# Patient Record
Sex: FEMALE | Race: WHITE | NOT HISPANIC OR LATINO | URBAN - METROPOLITAN AREA
[De-identification: names, ages, dates, MRNs, and addresses within clinical notes are randomized per-mention and may not be internally consistent; named-entity substitution may affect disease eponyms.]

---

## 2005-11-01 LAB — HM MAMMOGRAM: NORMAL

## 2005-11-09 LAB — HM DXA SCAN: NORMAL

## 2006-11-28 LAB — HM PAP SMEAR: NORMAL

## 2017-07-13 ENCOUNTER — INPATIENT HOSPITAL (OUTPATIENT)
Dept: URBAN - METROPOLITAN AREA HOSPITAL 84 | Facility: HOSPITAL | Age: 57
End: 2017-07-13
Payer: MEDICAID

## 2017-07-13 DIAGNOSIS — R19.7 DIARRHEA, UNSPECIFIED: ICD-10-CM

## 2017-07-13 DIAGNOSIS — K62.5 HEMORRHAGE OF ANUS AND RECTUM: ICD-10-CM

## 2017-07-13 PROCEDURE — 99254 IP/OBS CNSLTJ NEW/EST MOD 60: CPT | Performed by: NURSE PRACTITIONER

## 2017-07-14 ENCOUNTER — INPATIENT HOSPITAL (OUTPATIENT)
Dept: URBAN - METROPOLITAN AREA HOSPITAL 84 | Facility: HOSPITAL | Age: 57
End: 2017-07-14
Payer: MEDICAID

## 2017-07-14 DIAGNOSIS — C20 MALIGNANT NEOPLASM OF RECTUM: ICD-10-CM

## 2017-07-14 DIAGNOSIS — K57.90 DIVERTICULOSIS OF INTESTINE, PART UNSPECIFIED, WITHOUT PERFO: ICD-10-CM

## 2017-07-14 DIAGNOSIS — K52.9 NONINFECTIVE GASTROENTERITIS AND COLITIS, UNSPECIFIED: ICD-10-CM

## 2017-07-14 PROCEDURE — 45385 COLONOSCOPY W/LESION REMOVAL: CPT | Performed by: INTERNAL MEDICINE

## 2017-07-15 ENCOUNTER — INPATIENT HOSPITAL (OUTPATIENT)
Dept: URBAN - METROPOLITAN AREA HOSPITAL 84 | Facility: HOSPITAL | Age: 57
End: 2017-07-15
Payer: MEDICAID

## 2017-07-15 DIAGNOSIS — K52.9 NONINFECTIVE GASTROENTERITIS AND COLITIS, UNSPECIFIED: ICD-10-CM

## 2017-07-15 DIAGNOSIS — K57.90 DIVERTICULOSIS OF INTESTINE, PART UNSPECIFIED, WITHOUT PERFO: ICD-10-CM

## 2017-07-15 DIAGNOSIS — C20 MALIGNANT NEOPLASM OF RECTUM: ICD-10-CM

## 2017-07-15 PROCEDURE — 99231 SBSQ HOSP IP/OBS SF/LOW 25: CPT | Performed by: NURSE PRACTITIONER

## 2017-07-28 ENCOUNTER — OFFICE (OUTPATIENT)
Dept: URBAN - METROPOLITAN AREA CLINIC 64 | Facility: CLINIC | Age: 57
End: 2017-07-28
Payer: MEDICAID

## 2017-07-28 VITALS
SYSTOLIC BLOOD PRESSURE: 144 MMHG | HEIGHT: 67 IN | WEIGHT: 157 LBS | DIASTOLIC BLOOD PRESSURE: 91 MMHG | HEART RATE: 63 BPM

## 2017-07-28 DIAGNOSIS — K59.1 FUNCTIONAL DIARRHEA: ICD-10-CM

## 2017-07-28 DIAGNOSIS — C80.1 MALIGNANT (PRIMARY) NEOPLASM, UNSPECIFIED: ICD-10-CM

## 2017-07-28 PROCEDURE — 99213 OFFICE O/P EST LOW 20 MIN: CPT | Performed by: NURSE PRACTITIONER

## 2017-10-26 ENCOUNTER — CONVERSION ENCOUNTER (OUTPATIENT)
Dept: FAMILY MEDICINE CLINIC | Facility: CLINIC | Age: 57
End: 2017-10-26

## 2017-10-26 LAB
ALBUMIN SERPL-MCNC: 4.4 G/DL (ref 3.6–5.1)
ALP SERPL-CCNC: 68 U/L (ref 33–130)
AST SERPL-CCNC: 29 U/L (ref 10–35)
BASOPHILS # BLD AUTO: 100 CELLS/UL (ref 0–200)
BILIRUB SERPL-MCNC: 0.7 MG/DL (ref 0.2–1.2)
BUN SERPL-MCNC: 18 MG/DL (ref 7–25)
BUN/CREAT SERPL: 12 (CALC) (ref 6–22)
CALCIUM SERPL-MCNC: 10 MG/DL (ref 8.6–10.2)
CHLORIDE SERPL-SCNC: 103 MMOL/L (ref 98–110)
CONV CO2: 24 MMOL/L (ref 21–33)
CONV TOTAL PROTEIN: 7 G/DL (ref 6.2–8.3)
CREAT UR-MCNC: 1.5 MG/DL (ref 0.6–1.1)
EOSINOPHIL # BLD AUTO: 100 CELLS/UL (ref 15–500)
EOSINOPHIL # BLD AUTO: 2 %
ERYTHROCYTE [DISTWIDTH] IN BLOOD BY AUTOMATED COUNT: 14.3 % (ref 11–15)
GLOBULIN UR ELPH-MCNC: 2.6 MG/DL (ref 2.2–3.9)
GLUCOSE UR QL: 107 MG/DL (ref 65–99)
HCT VFR BLD AUTO: 40.7 % (ref 35–45)
HGB BLD-MCNC: 13.6 G/DL (ref 11.7–15.5)
INSULIN SERPL-ACNC: 1.7 (CALC) (ref 1–2.1)
LYMPHOCYTES # BLD AUTO: 1500 CELLS/UL (ref 850–3900)
LYMPHOCYTES NFR BLD AUTO: 22 %
MCH RBC QN AUTO: 32 PG (ref 27–33)
MCHC RBC AUTO-ENTMCNC: 33.5 G/DL (ref 32–36)
MCV RBC AUTO: 95.7 FL (ref 80–100)
MONOCYTES # BLD AUTO: 500 CELLS/UL (ref 200–950)
MONOCYTES NFR BLD AUTO: 8 %
NEUTROPHILS # BLD AUTO: 4500 CELLS/UL (ref 1500–7800)
NEUTROPHILS NFR BLD AUTO: 68 %
PLATELET # BLD AUTO: 283 10*3/UL (ref 140–400)
PMV BLD AUTO: 8.4 FL (ref 7.5–11.5)
POTASSIUM SERPL-SCNC: 4.4 MMOL/L (ref 3.5–5.3)
RBC # BLD AUTO: 4.25 MILLION/UL (ref 3.8–5.1)
SODIUM SERPL-SCNC: 138 MMOL/L (ref 135–146)
WBC # BLD AUTO: 6.7 10*3/UL (ref 3.8–10.8)

## 2018-06-08 LAB — HM COLONOSCOPY: NORMAL

## 2019-10-01 RX ORDER — ATENOLOL 50 MG/1
50 TABLET ORAL 2 TIMES DAILY
Qty: 60 TABLET | Refills: 12 | Status: SHIPPED | OUTPATIENT
Start: 2019-10-01 | End: 2019-10-07 | Stop reason: SDUPTHER

## 2019-10-07 ENCOUNTER — TELEPHONE (OUTPATIENT)
Dept: FAMILY MEDICINE CLINIC | Facility: CLINIC | Age: 59
End: 2019-10-07

## 2019-10-07 DIAGNOSIS — I10 ESSENTIAL HYPERTENSION: Primary | ICD-10-CM

## 2019-10-07 RX ORDER — ATENOLOL 50 MG/1
50 TABLET ORAL 2 TIMES DAILY
Qty: 60 TABLET | Refills: 0 | Status: SHIPPED | OUTPATIENT
Start: 2019-10-07 | End: 2020-10-08 | Stop reason: SDUPTHER

## 2019-12-06 DIAGNOSIS — Z13.220 SCREENING FOR HYPERLIPIDEMIA: ICD-10-CM

## 2019-12-06 DIAGNOSIS — I10 ESSENTIAL HYPERTENSION: Primary | ICD-10-CM

## 2019-12-10 LAB
ALBUMIN SERPL-MCNC: 5.2 G/DL (ref 3.5–5.5)
ALBUMIN/GLOB SERPL: 2.1 {RATIO} (ref 1.2–2.2)
ALP SERPL-CCNC: 66 IU/L (ref 39–117)
ALT SERPL-CCNC: 16 IU/L (ref 0–32)
AST SERPL-CCNC: 19 IU/L (ref 0–40)
BASOPHILS # BLD AUTO: 0.1 X10E3/UL (ref 0–0.2)
BASOPHILS NFR BLD AUTO: 1 %
BILIRUB SERPL-MCNC: 0.6 MG/DL (ref 0–1.2)
BUN SERPL-MCNC: 14 MG/DL (ref 6–24)
BUN/CREAT SERPL: 12 (ref 9–23)
CALCIUM SERPL-MCNC: 10.2 MG/DL (ref 8.7–10.2)
CHLORIDE SERPL-SCNC: 98 MMOL/L (ref 96–106)
CHOLEST SERPL-MCNC: 234 MG/DL (ref 100–199)
CHOLEST/HDLC SERPL: 2.5 RATIO (ref 0–4.4)
CO2 SERPL-SCNC: 22 MMOL/L (ref 20–29)
CREAT SERPL-MCNC: 1.16 MG/DL (ref 0.57–1)
EOSINOPHIL # BLD AUTO: 0 X10E3/UL (ref 0–0.4)
EOSINOPHIL NFR BLD AUTO: 1 %
ERYTHROCYTE [DISTWIDTH] IN BLOOD BY AUTOMATED COUNT: 13.5 % (ref 12.3–15.4)
GLOBULIN SER CALC-MCNC: 2.5 G/DL (ref 1.5–4.5)
GLUCOSE SERPL-MCNC: 117 MG/DL (ref 65–99)
HCT VFR BLD AUTO: 41.8 % (ref 34–46.6)
HDLC SERPL-MCNC: 95 MG/DL
HGB BLD-MCNC: 14 G/DL (ref 11.1–15.9)
IMM GRANULOCYTES # BLD AUTO: 0 X10E3/UL (ref 0–0.1)
IMM GRANULOCYTES NFR BLD AUTO: 0 %
LDLC SERPL CALC-MCNC: 125 MG/DL (ref 0–99)
LYMPHOCYTES # BLD AUTO: 1.5 X10E3/UL (ref 0.7–3.1)
LYMPHOCYTES NFR BLD AUTO: 23 %
MCH RBC QN AUTO: 34 PG (ref 26.6–33)
MCHC RBC AUTO-ENTMCNC: 33.5 G/DL (ref 31.5–35.7)
MCV RBC AUTO: 102 FL (ref 79–97)
MONOCYTES # BLD AUTO: 0.5 X10E3/UL (ref 0.1–0.9)
MONOCYTES NFR BLD AUTO: 8 %
NEUTROPHILS # BLD AUTO: 4.5 X10E3/UL (ref 1.4–7)
NEUTROPHILS NFR BLD AUTO: 67 %
PLATELET # BLD AUTO: 318 X10E3/UL (ref 150–450)
POTASSIUM SERPL-SCNC: 4.8 MMOL/L (ref 3.5–5.2)
PROT SERPL-MCNC: 7.7 G/DL (ref 6–8.5)
RBC # BLD AUTO: 4.12 X10E6/UL (ref 3.77–5.28)
SODIUM SERPL-SCNC: 139 MMOL/L (ref 134–144)
TRIGL SERPL-MCNC: 70 MG/DL (ref 0–149)
TSH SERPL DL<=0.005 MIU/L-ACNC: 0.9 UIU/ML (ref 0.45–4.5)
VLDLC SERPL CALC-MCNC: 14 MG/DL (ref 5–40)
WBC # BLD AUTO: 6.7 X10E3/UL (ref 3.4–10.8)

## 2019-12-11 LAB
CEA SERPL-MCNC: 2.1 NG/ML (ref 0–4.7)
WRITTEN AUTHORIZATION: NORMAL

## 2019-12-13 ENCOUNTER — OFFICE VISIT (OUTPATIENT)
Dept: FAMILY MEDICINE CLINIC | Facility: CLINIC | Age: 59
End: 2019-12-13

## 2019-12-13 VITALS
RESPIRATION RATE: 18 BRPM | TEMPERATURE: 97.9 F | SYSTOLIC BLOOD PRESSURE: 138 MMHG | OXYGEN SATURATION: 97 % | WEIGHT: 176.2 LBS | HEIGHT: 67 IN | HEART RATE: 71 BPM | BODY MASS INDEX: 27.65 KG/M2 | DIASTOLIC BLOOD PRESSURE: 88 MMHG

## 2019-12-13 DIAGNOSIS — Z00.01 ANNUAL VISIT FOR GENERAL ADULT MEDICAL EXAMINATION WITH ABNORMAL FINDINGS: Primary | ICD-10-CM

## 2019-12-13 DIAGNOSIS — Z23 NEED FOR INFLUENZA VACCINATION: ICD-10-CM

## 2019-12-13 DIAGNOSIS — Z78.0 ASYMPTOMATIC MENOPAUSAL STATE: ICD-10-CM

## 2019-12-13 DIAGNOSIS — Z12.39 ENCOUNTER FOR SCREENING FOR MALIGNANT NEOPLASM OF BREAST: ICD-10-CM

## 2019-12-13 DIAGNOSIS — E66.3 OVERWEIGHT: ICD-10-CM

## 2019-12-13 DIAGNOSIS — I10 HYPERTENSION, BENIGN: ICD-10-CM

## 2019-12-13 DIAGNOSIS — Z85.048 HISTORY OF RECTAL CANCER: ICD-10-CM

## 2019-12-13 DIAGNOSIS — Z12.4 CERVICAL CANCER SCREENING: ICD-10-CM

## 2019-12-13 DIAGNOSIS — Z13.220 SCREENING FOR HYPERLIPIDEMIA: ICD-10-CM

## 2019-12-13 LAB
BILIRUB BLD-MCNC: NEGATIVE MG/DL
CLARITY, POC: CLEAR
COLOR UR: YELLOW
GLUCOSE UR STRIP-MCNC: NEGATIVE MG/DL
KETONES UR QL: NEGATIVE
LEUKOCYTE EST, POC: NEGATIVE
NITRITE UR-MCNC: NEGATIVE MG/ML
PH UR: 6 [PH] (ref 5–8)
PROT UR STRIP-MCNC: NEGATIVE MG/DL
RBC # UR STRIP: NEGATIVE /UL
SP GR UR: 1 (ref 1–1.03)
UROBILINOGEN UR QL: NORMAL

## 2019-12-13 PROCEDURE — 90674 CCIIV4 VAC NO PRSV 0.5 ML IM: CPT | Performed by: FAMILY MEDICINE

## 2019-12-13 PROCEDURE — 90471 IMMUNIZATION ADMIN: CPT | Performed by: FAMILY MEDICINE

## 2019-12-13 PROCEDURE — 99396 PREV VISIT EST AGE 40-64: CPT | Performed by: FAMILY MEDICINE

## 2019-12-13 PROCEDURE — 99213 OFFICE O/P EST LOW 20 MIN: CPT | Performed by: FAMILY MEDICINE

## 2019-12-13 PROCEDURE — 81002 URINALYSIS NONAUTO W/O SCOPE: CPT | Performed by: FAMILY MEDICINE

## 2019-12-13 NOTE — PROGRESS NOTES
Subjective   Alisha Grimaldo is a 59 y.o. female.     Chief Complaint   Patient presents with   • Annual Exam   • Hypertension       The patient is here: for coordination of medical care to discuss health maintenance and disease prevention. Overall has: moderate activity with work/home activities, good appetite, feels well with no complaints, good energy level and is sleeping well. Nutrition: balanced diet. Last tetanus shot was 2011.     Hypertension   This is a chronic problem. The current episode started more than 1 year ago. The problem has been gradually improving since onset. The problem is controlled. Pertinent negatives include no anxiety, blurred vision, chest pain, malaise/fatigue, orthopnea, palpitations, peripheral edema, PND or shortness of breath. Risk factors for coronary artery disease include family history and sedentary lifestyle. Current antihypertension treatment includes beta blockers. The current treatment provides moderate improvement. There are no compliance problems.         I personally reviewed and updated the patient's allergies, medications, problem list, and past medical, surgical, social, and family history.     Allergies:  No Known Allergies    Social History:  Social History     Socioeconomic History   • Marital status:      Spouse name: Not on file   • Number of children: Not on file   • Years of education: Not on file   • Highest education level: Not on file   Tobacco Use   • Smoking status: Never Smoker   • Smokeless tobacco: Never Used   Substance and Sexual Activity   • Alcohol use: Yes     Frequency: 2-3 times a week     Drinks per session: 1 or 2     Binge frequency: Never   • Drug use: Never   • Sexual activity: Defer       Family History:  Family History   Problem Relation Age of Onset   • Hypertension Mother    • Hypertension Father        Past Medical History :  Patient Active Problem List   Diagnosis   • Rectal adenocarcinoma (CMS/HCC)   • Overweight   • History  of rectal cancer   • Hypertension, benign   • Allergic rhinitis due to pollen   • Encounter for screening for malignant neoplasm of breast   • Annual visit for general adult medical examination with abnormal findings   • Cervical cancer screening   • Asymptomatic menopausal state   • Screening for hyperlipidemia       Medication List:    Current Outpatient Medications:   •  atenolol (TENORMIN) 50 MG tablet, Take 1 tablet by mouth 2 (Two) Times a Day., Disp: 60 tablet, Rfl: 0  •  Cranberry 500 MG tablet, Take 1 tablet by mouth Daily., Disp: , Rfl:   •  Probiotic Product (PROBIOTIC-10 PO), Take 1 capsule by mouth Daily., Disp: , Rfl:   •  ST JOHNS WORT EXTRACT PO, Take 1 tablet by mouth Daily., Disp: , Rfl:     Past Surgical History:  Past Surgical History:   Procedure Laterality Date   • BREAST LUMPECTOMY Left 1982    Benign   • TONSILLECTOMY AND ADENOIDECTOMY      Age 18       Depression Screen:   PHQ-2/PHQ-9 Depression Screening 12/13/2019   Little interest or pleasure in doing things 0   Feeling down, depressed, or hopeless 0   Total Score 0       Review Of Systems:  Review of Systems   Constitutional: Negative for activity change, appetite change, fatigue, fever, malaise/fatigue, unexpected weight gain and unexpected weight loss.   HENT: Negative for congestion, ear pain, hearing loss, rhinorrhea, sinus pressure, sore throat, swollen glands, trouble swallowing and voice change.    Eyes: Negative for blurred vision and visual disturbance.   Respiratory: Negative for apnea, cough, shortness of breath and wheezing.    Cardiovascular: Negative for chest pain, palpitations, orthopnea and PND.   Gastrointestinal: Negative for abdominal pain, blood in stool, constipation, diarrhea, nausea, vomiting and indigestion.   Endocrine: Negative for cold intolerance, heat intolerance, polydipsia and polyuria.   Genitourinary: Negative for breast discharge, breast lump, breast pain, dysuria, flank pain, frequency, pelvic pain and  "vaginal bleeding.   Musculoskeletal: Negative for arthralgias, joint swelling and myalgias.   Skin: Negative for rash, skin lesions and bruise.   Allergic/Immunologic: Positive for immunocompromised state (hx of rectal cancer). Negative for environmental allergies.   Neurological: Negative for dizziness, syncope, weakness, numbness, headache and memory problem.   Hematological: Negative for adenopathy. Does not bruise/bleed easily.   Psychiatric/Behavioral: Negative for suicidal ideas and depressed mood. The patient is not nervous/anxious.        Vital Signs:  Visit Vitals  /88 (BP Location: Right arm, Patient Position: Sitting, Cuff Size: Adult)   Pulse 71   Temp 97.9 °F (36.6 °C) (Oral)   Resp 18   Ht 168.9 cm (66.5\")   Wt 79.9 kg (176 lb 3.2 oz)   LMP 07/12/2016   SpO2 97% Comment: Room air   BMI 28.01 kg/m²       Physical Exam:  Physical Exam   Constitutional: She is oriented to person, place, and time. She appears well-developed and well-nourished. No distress.   HENT:   Head: Normocephalic. Hair is normal.   Right Ear: Tympanic membrane and external ear normal.   Left Ear: Tympanic membrane and external ear normal.   Nose: Nose normal. No mucosal edema.   Mouth/Throat: Uvula is midline, oropharynx is clear and moist and mucous membranes are normal.   Eyes: Pupils are equal, round, and reactive to light. Conjunctivae and EOM are normal. Right eye exhibits no discharge. Left eye exhibits no discharge.   Neck: Normal range of motion. Neck supple. No JVD present. No muscular tenderness present. No thyromegaly present.   Cardiovascular: Normal rate, regular rhythm and normal heart sounds. PMI is not displaced. Exam reveals no gallop and no friction rub.   No murmur heard.  Pulses:       Carotid pulses are 2+ on the right side, and 2+ on the left side.       Femoral pulses are 2+ on the right side, and 2+ on the left side.       Dorsalis pedis pulses are 2+ on the right side, and 2+ on the left side. "   Pulmonary/Chest: Effort normal and breath sounds normal. No respiratory distress. She has no decreased breath sounds. She has no wheezes. She has no rhonchi. She has no rales. Right breast exhibits no inverted nipple, no mass, no nipple discharge, no skin change and no tenderness. Left breast exhibits no inverted nipple, no mass, no nipple discharge, no skin change and no tenderness. No breast bleeding. Breasts are symmetrical.   Abdominal: Soft. Bowel sounds are normal. She exhibits no distension, no abdominal bruit and no mass. There is no hepatosplenomegaly. There is no CVA tenderness. Hernia confirmed negative in the right inguinal area and confirmed negative in the left inguinal area.   Genitourinary: Vagina normal, uterus normal and cervix normal. Rectal exam shows guaiac negative stool. Pelvic exam was performed with patient supine. There is no rash or tenderness on the right labia. There is no rash or tenderness on the left labia. Right adnexum displays no mass and no tenderness. Left adnexum displays no mass and no tenderness. No vaginal discharge found.   Musculoskeletal: Normal range of motion. She exhibits no edema, tenderness or deformity.   Lymphadenopathy:     She has no cervical adenopathy.        Right: No inguinal and no supraclavicular adenopathy present.        Left: No inguinal and no supraclavicular adenopathy present.   Neurological: She is alert and oriented to person, place, and time. She has normal strength and normal reflexes. No cranial nerve deficit or sensory deficit. She exhibits normal muscle tone. Coordination normal.   Skin: No ecchymosis, no lesion and no rash noted. No erythema.   Psychiatric: She has a normal mood and affect. Her speech is normal and behavior is normal. Judgment and thought content normal. Cognition and memory are normal. She does not express impulsivity. She expresses no suicidal ideation. She exhibits normal recent memory and normal remote memory.          Assessment and Plan:  Problem List Items Addressed This Visit        High    History of rectal cancer    Overview     Last sigmoidoscopy 09/13/2019 with Dr. Susan Mosqueda  Repeat x 1 yr  S/p resection, by colonoscopy, CEA today wnl.          Hypertension, benign    Overview     Controlled.          Current Assessment & Plan     Hypertension is improving with treatment.  Continue current treatment regimen.  Dietary sodium restriction.  Weight loss.  Regular aerobic exercise.  Blood pressure will be reassessed  1 yr.            Low    Overweight    Overview     Diet exercise and wt loss encouraged.          Current Assessment & Plan     Obesity is improving with lifestyle modifications.  Discussed the patient's BMI.  The BMI is above average; BMI management plan is completed.  General weight loss/lifestyle modification strategies discussed (elicit support from others; identify saboteurs; non-food rewards, etc).            Unprioritized    Encounter for screening for malignant neoplasm of breast    Overview     Last mammogram 11/2019 at [facility]         Annual visit for general adult medical examination with abnormal findings - Primary    Overview     Good diet  Regular exercise, breast self exams, seat belts, sunscreens encouraged. F/U 1 yr or prn problems         Relevant Orders    POCT urinalysis dipstick, manual (Completed)    Cervical cancer screening    Overview     Last pap smear 07/27/2015 negative         Relevant Orders    Liquid-based Pap Smear, Diagnostic    HPV, Low Volume Reflex (Completed)    Asymptomatic menopausal state    Overview     Last bone dexa scan 11/09/2005 normal         Relevant Orders    DEXA Bone Density Axial    Screening for hyperlipidemia      Other Visit Diagnoses     Need for influenza vaccination        Relevant Orders    Flucelvax Quad=>4Years (PFS) (Completed)          An After Visit Summary and PPPS were given to the patient.

## 2019-12-19 LAB
CYTOLOGIST CVX/VAG CYTO: NORMAL
CYTOLOGY CVX/VAG DOC CYTO: NORMAL
DX ICD CODE: NORMAL
HIV 1 & 2 AB SER-IMP: NORMAL
HPV I/H RISK 1 DNA CVX QL PROBE+SIG AMP: NORMAL
HPV I/H RISK 4 DNA CVX QL PROBE+SIG AMP: NEGATIVE
Lab: NORMAL
OTHER STN SPEC: NORMAL
STAT OF ADQ CVX/VAG CYTO-IMP: NORMAL

## 2019-12-26 NOTE — ASSESSMENT & PLAN NOTE
Hypertension is improving with treatment.  Continue current treatment regimen.  Dietary sodium restriction.  Weight loss.  Regular aerobic exercise.  Blood pressure will be reassessed  1 yr.

## 2020-08-07 ENCOUNTER — OFFICE VISIT (OUTPATIENT)
Dept: FAMILY MEDICINE CLINIC | Facility: CLINIC | Age: 60
End: 2020-08-07

## 2020-08-07 VITALS
BODY MASS INDEX: 26.84 KG/M2 | HEIGHT: 67 IN | DIASTOLIC BLOOD PRESSURE: 90 MMHG | SYSTOLIC BLOOD PRESSURE: 161 MMHG | HEART RATE: 82 BPM | TEMPERATURE: 98.2 F | OXYGEN SATURATION: 98 % | RESPIRATION RATE: 18 BRPM | WEIGHT: 171 LBS

## 2020-08-07 DIAGNOSIS — J06.9 UPPER RESPIRATORY TRACT INFECTION, UNSPECIFIED TYPE: Primary | ICD-10-CM

## 2020-08-07 PROCEDURE — 99213 OFFICE O/P EST LOW 20 MIN: CPT | Performed by: FAMILY MEDICINE

## 2020-08-07 RX ORDER — AZITHROMYCIN 250 MG/1
TABLET, FILM COATED ORAL
Qty: 6 TABLET | Refills: 0 | Status: SHIPPED | OUTPATIENT
Start: 2020-08-07 | End: 2020-12-15

## 2020-08-07 NOTE — PROGRESS NOTES
Subjective   Alisha Grimaldo is a 60 y.o. female.     Chief Complaint   Patient presents with   • URI     coughed up blood       URI    This is a new problem. The current episode started yesterday. There has been no fever. Associated symptoms include congestion, coughing and nausea. Pertinent negatives include no abdominal pain, chest pain, diarrhea, ear pain, headaches, sinus pain, sneezing, sore throat, swollen glands or vomiting. She has tried nothing for the symptoms. The treatment provided no relief.            I personally reviewed and updated the patient's allergies, medications, problem list, and past medical, surgical, social, and family history.     Family History   Problem Relation Age of Onset   • Hypertension Mother    • Hypertension Father        Social History     Tobacco Use   • Smoking status: Never Smoker   • Smokeless tobacco: Never Used   Substance Use Topics   • Alcohol use: Yes     Frequency: 2-3 times a week     Drinks per session: 1 or 2     Binge frequency: Never   • Drug use: Never       Past Surgical History:   Procedure Laterality Date   • BREAST LUMPECTOMY Left 1982    Benign   • TONSILLECTOMY AND ADENOIDECTOMY      Age 18       Patient Active Problem List   Diagnosis   • Rectal adenocarcinoma (CMS/HCC)   • Overweight   • History of rectal cancer   • Hypertension, benign   • Allergic rhinitis due to pollen   • Encounter for screening for malignant neoplasm of breast   • Annual visit for general adult medical examination with abnormal findings   • Cervical cancer screening   • Asymptomatic menopausal state   • Screening for hyperlipidemia   • Upper respiratory tract infection         Current Outpatient Medications:   •  atenolol (TENORMIN) 50 MG tablet, Take 1 tablet by mouth 2 (Two) Times a Day., Disp: 60 tablet, Rfl: 0  •  azithromycin (ZITHROMAX) 250 MG tablet, Take 2 tablets the first day, then 1 tablet daily for 4 days., Disp: 6 tablet, Rfl: 0  •  Cranberry 500 MG tablet, Take 1  "tablet by mouth Daily., Disp: , Rfl:   •  Probiotic Product (PROBIOTIC-10 PO), Take 1 capsule by mouth Daily., Disp: , Rfl:   •  ST JOHNS WORT EXTRACT PO, Take 1 tablet by mouth Daily., Disp: , Rfl:          Review of Systems   Constitutional: Negative for chills and diaphoresis.   HENT: Positive for congestion. Negative for ear pain, sneezing, sore throat, swollen glands, trouble swallowing and voice change.    Eyes: Negative for visual disturbance.   Respiratory: Positive for cough. Negative for shortness of breath.    Cardiovascular: Negative for chest pain and palpitations.   Gastrointestinal: Positive for nausea. Negative for abdominal pain, diarrhea and vomiting.   Endocrine: Negative for polydipsia and polyphagia.   Genitourinary: Negative for hematuria.   Musculoskeletal: Negative for neck stiffness.   Skin: Negative for color change and pallor.   Allergic/Immunologic: Negative for immunocompromised state.   Neurological: Negative for seizures and syncope.   Hematological: Negative for adenopathy.   Psychiatric/Behavioral: Negative for sleep disturbance and suicidal ideas.       I have reviewed and confirmed the accuracy of the ROS as documented by the MA/LPN/RN Dylon Carpenter MD      Objective   /90 (BP Location: Right arm, Patient Position: Sitting, Cuff Size: Adult)   Pulse 82   Temp 98.2 °F (36.8 °C)   Resp 18   Ht 168.9 cm (66.5\")   Wt 77.6 kg (171 lb)   LMP 07/12/2016   SpO2 98%   BMI 27.19 kg/m²   BP Readings from Last 3 Encounters:   08/07/20 161/90   12/13/19 138/88   07/27/18 144/88     Wt Readings from Last 3 Encounters:   08/07/20 77.6 kg (171 lb)   12/13/19 79.9 kg (176 lb 3.2 oz)   07/27/18 77.2 kg (170 lb 4 oz)     Physical Exam   Constitutional: She is oriented to person, place, and time. She appears well-developed and well-nourished.   HENT:   Head: Normocephalic.   Right Ear: Hearing, external ear and ear canal normal. Tympanic membrane is erythematous. A middle ear effusion " is present.   Left Ear: Hearing, external ear and ear canal normal. Tympanic membrane is erythematous. A middle ear effusion is present.   Nose: Congestion present. Right sinus exhibits maxillary sinus tenderness and frontal sinus tenderness. Left sinus exhibits maxillary sinus tenderness and frontal sinus tenderness.   Mouth/Throat: Posterior oropharyngeal erythema present. No tonsillar abscesses. Tonsils are 1+ on the right. Tonsils are 1+ on the left.   Eyes: Pupils are equal, round, and reactive to light. Conjunctivae, EOM and lids are normal.   Neck: No Brudzinski's sign and no Kernig's sign noted.   Cardiovascular: Normal rate, regular rhythm, S1 normal, S2 normal, normal heart sounds and normal pulses. Exam reveals no gallop and no friction rub.   No murmur heard.  Pulmonary/Chest: Effort normal. No accessory muscle usage or stridor. No respiratory distress. She has no decreased breath sounds. She has wheezes in the right upper field, the right middle field, the right lower field, the left upper field, the left middle field and the left lower field. She has rhonchi in the right upper field, the right middle field, the right lower field, the left upper field, the left middle field and the left lower field. She has no rales.   Abdominal: Soft. Normal appearance and bowel sounds are normal. She exhibits no distension and no mass. There is no tenderness. No hernia.   Neurological: She is alert and oriented to person, place, and time. No cranial nerve deficit. Coordination and gait normal.   Skin: Skin is warm and dry. Turgor is normal. She is not diaphoretic. No pallor.         Assessment/Plan      Medications        Problem List         LOS    Acute bacterial bronchitis.  Start antibiotics.  Call return if fever or worsening symptoms.  Allergic rhinitis.  Restart antihistamine.  Coughing up small amount blood tinged sputum, O2 sat normal.  Call or return if worsening symptoms.    Problem List Items Addressed  This Visit        Unprioritized    Upper respiratory tract infection - Primary    Relevant Medications    azithromycin (ZITHROMAX) 250 MG tablet              Expected course, medications, and adverse effects discussed.  Call or return if worsening or persistent symptoms.

## 2020-10-08 DIAGNOSIS — I10 ESSENTIAL HYPERTENSION: ICD-10-CM

## 2020-10-10 RX ORDER — ATENOLOL 50 MG/1
50 TABLET ORAL 2 TIMES DAILY
Qty: 90 TABLET | Refills: 0 | Status: SHIPPED | OUTPATIENT
Start: 2020-10-10 | End: 2020-11-11 | Stop reason: SDUPTHER

## 2020-11-11 DIAGNOSIS — I10 ESSENTIAL HYPERTENSION: ICD-10-CM

## 2020-11-11 RX ORDER — ATENOLOL 50 MG/1
50 TABLET ORAL 2 TIMES DAILY
Qty: 30 TABLET | Refills: 1 | Status: SHIPPED | OUTPATIENT
Start: 2020-11-11 | End: 2020-12-16 | Stop reason: SDUPTHER

## 2020-12-14 PROBLEM — Z12.11 COLON CANCER SCREENING: Status: ACTIVE | Noted: 2020-12-14

## 2020-12-15 ENCOUNTER — OFFICE VISIT (OUTPATIENT)
Dept: FAMILY MEDICINE CLINIC | Facility: CLINIC | Age: 60
End: 2020-12-15

## 2020-12-15 VITALS
DIASTOLIC BLOOD PRESSURE: 88 MMHG | RESPIRATION RATE: 18 BRPM | TEMPERATURE: 97.3 F | HEART RATE: 83 BPM | SYSTOLIC BLOOD PRESSURE: 152 MMHG | OXYGEN SATURATION: 99 % | BODY MASS INDEX: 28.12 KG/M2 | WEIGHT: 179.2 LBS | HEIGHT: 67 IN

## 2020-12-15 DIAGNOSIS — I10 HYPERTENSION, BENIGN: ICD-10-CM

## 2020-12-15 DIAGNOSIS — Z00.01 ANNUAL VISIT FOR GENERAL ADULT MEDICAL EXAMINATION WITH ABNORMAL FINDINGS: Primary | ICD-10-CM

## 2020-12-15 DIAGNOSIS — E66.3 OVERWEIGHT: ICD-10-CM

## 2020-12-15 DIAGNOSIS — Z23 NEED FOR INFLUENZA VACCINATION: ICD-10-CM

## 2020-12-15 DIAGNOSIS — Z13.220 SCREENING FOR HYPERLIPIDEMIA: ICD-10-CM

## 2020-12-15 DIAGNOSIS — Z12.4 CERVICAL CANCER SCREENING: ICD-10-CM

## 2020-12-15 DIAGNOSIS — Z12.31 ENCOUNTER FOR SCREENING MAMMOGRAM FOR MALIGNANT NEOPLASM OF BREAST: ICD-10-CM

## 2020-12-15 DIAGNOSIS — Z85.048 HISTORY OF RECTAL CANCER: ICD-10-CM

## 2020-12-15 DIAGNOSIS — J30.1 SEASONAL ALLERGIC RHINITIS DUE TO POLLEN: ICD-10-CM

## 2020-12-15 DIAGNOSIS — Z78.0 ASYMPTOMATIC MENOPAUSAL STATE: ICD-10-CM

## 2020-12-15 LAB
BILIRUB BLD-MCNC: NEGATIVE MG/DL
CLARITY, POC: CLEAR
COLOR UR: YELLOW
GLUCOSE UR STRIP-MCNC: NEGATIVE MG/DL
KETONES UR QL: NEGATIVE
LEUKOCYTE EST, POC: NEGATIVE
NITRITE UR-MCNC: NEGATIVE MG/ML
PH UR: 5 [PH] (ref 5–8)
PROT UR STRIP-MCNC: NEGATIVE MG/DL
RBC # UR STRIP: NEGATIVE /UL
SP GR UR: 1.01 (ref 1–1.03)
UROBILINOGEN UR QL: NORMAL

## 2020-12-15 PROCEDURE — 36415 COLL VENOUS BLD VENIPUNCTURE: CPT | Performed by: FAMILY MEDICINE

## 2020-12-15 PROCEDURE — 81002 URINALYSIS NONAUTO W/O SCOPE: CPT | Performed by: FAMILY MEDICINE

## 2020-12-15 PROCEDURE — 99396 PREV VISIT EST AGE 40-64: CPT | Performed by: FAMILY MEDICINE

## 2020-12-15 PROCEDURE — 90686 IIV4 VACC NO PRSV 0.5 ML IM: CPT | Performed by: FAMILY MEDICINE

## 2020-12-15 PROCEDURE — 90471 IMMUNIZATION ADMIN: CPT | Performed by: FAMILY MEDICINE

## 2020-12-15 PROCEDURE — 99213 OFFICE O/P EST LOW 20 MIN: CPT | Performed by: FAMILY MEDICINE

## 2020-12-15 RX ORDER — LISINOPRIL 20 MG/1
20 TABLET ORAL DAILY
Qty: 30 TABLET | Refills: 12 | Status: SHIPPED | OUTPATIENT
Start: 2020-12-15 | End: 2022-01-25 | Stop reason: SINTOL

## 2020-12-15 NOTE — PROGRESS NOTES
Subjective   Alisha Grimaldo is a 60 y.o. female.     Chief Complaint   Patient presents with   • Annual Exam   • Hypertension       The patient is here: for coordination of medical care. Overall has: moderate activity with work/home activities, exercises 2 - 3 times per week, good appetite, feels well with no complaints, good energy level and is sleeping well. Nutrition: balanced diet. Last tetanus shot was 2011.     Hypertension  This is a chronic problem. The current episode started more than 1 year ago. The problem is uncontrolled. Pertinent negatives include no chest pain, headaches, orthopnea, palpitations, peripheral edema, PND, shortness of breath or sweats. Risk factors for coronary artery disease include post-menopausal state and dyslipidemia. Current antihypertension treatment includes nothing.        I personally reviewed and updated the patient's allergies, medications, problem list, and past medical, surgical, social, and family history.     Allergies:  No Known Allergies    Social History:  Social History     Socioeconomic History   • Marital status:      Spouse name: Not on file   • Number of children: Not on file   • Years of education: Not on file   • Highest education level: Not on file   Tobacco Use   • Smoking status: Never Smoker   • Smokeless tobacco: Never Used   Substance and Sexual Activity   • Alcohol use: Yes     Frequency: 2-3 times a week     Drinks per session: 1 or 2     Binge frequency: Never   • Drug use: Never   • Sexual activity: Defer       Family History:  Family History   Problem Relation Age of Onset   • Hypertension Mother    • Hypertension Father    • Breast cancer Niece 41   • Ovarian cancer Neg Hx        Past Medical History :  Patient Active Problem List   Diagnosis   • Rectal adenocarcinoma (CMS/HCC)   • Overweight   • History of rectal cancer   • Hypertension, benign   • Allergic rhinitis due to pollen   • Encounter for screening for malignant neoplasm of  breast   • Annual visit for general adult medical examination with abnormal findings   • Cervical cancer screening   • Asymptomatic menopausal state   • Screening for hyperlipidemia   • Upper respiratory tract infection       Medication List:    Current Outpatient Medications:   •  Cranberry 500 MG tablet, Take 1 tablet by mouth Daily., Disp: , Rfl:   •  ST JOHNS WORT EXTRACT PO, Take 1 tablet by mouth Daily., Disp: , Rfl:   •  atenolol (TENORMIN) 50 MG tablet, Take 1 tablet by mouth 2 (Two) Times a Day., Disp: 90 tablet, Rfl: 3  •  lisinopril (PRINIVIL,ZESTRIL) 20 MG tablet, Take 1 tablet by mouth Daily., Disp: 30 tablet, Rfl: 12    Past Surgical History:  Past Surgical History:   Procedure Laterality Date   • BREAST BIOPSY Left     benign   • BREAST LUMPECTOMY Left 1982    Benign   • TONSILLECTOMY AND ADENOIDECTOMY      Age 18       Depression Screen:   PHQ-2/PHQ-9 Depression Screening 12/15/2020   Little interest or pleasure in doing things 0   Feeling down, depressed, or hopeless 0   Total Score 0       Review Of Systems:  Review of Systems   Constitutional: Negative for activity change, appetite change, fatigue, fever, unexpected weight gain and unexpected weight loss.   HENT: Negative for congestion, ear pain, hearing loss, rhinorrhea, sinus pressure, sore throat, swollen glands, trouble swallowing and voice change.    Eyes: Negative for visual disturbance.   Respiratory: Negative for apnea, cough, shortness of breath and wheezing.    Cardiovascular: Negative for chest pain, palpitations, orthopnea and PND.   Gastrointestinal: Negative for abdominal pain, blood in stool, constipation, diarrhea, nausea, vomiting and indigestion.   Endocrine: Negative for cold intolerance, heat intolerance, polydipsia and polyuria.   Genitourinary: Negative for breast discharge, breast lump, breast pain, dysuria, flank pain, frequency, pelvic pain and vaginal bleeding.   Musculoskeletal: Negative for arthralgias, joint swelling  "and myalgias.   Skin: Negative for rash, skin lesions and bruise.   Allergic/Immunologic: Negative for environmental allergies and immunocompromised state.   Neurological: Negative for dizziness, syncope, weakness, numbness, headache and memory problem.   Hematological: Negative for adenopathy. Does not bruise/bleed easily.   Psychiatric/Behavioral: Negative for suicidal ideas and depressed mood. The patient is not nervous/anxious.        I have reviewed and confirmed the accuracy of the HPI and ROS as documented by the MA/LPN/RN Paola Beyer MD    Vital Signs:  Visit Vitals  /88 (BP Location: Right arm, Patient Position: Sitting, Cuff Size: Adult)   Pulse 83   Temp 97.3 °F (36.3 °C) (Temporal)   Resp 18   Ht 168.9 cm (66.5\")   Wt 81.3 kg (179 lb 3.2 oz)   LMP 07/12/2016   SpO2 99% Comment: room air   BMI 28.49 kg/m²       Physical Exam  Constitutional:       General: She is not in acute distress.     Appearance: She is well-developed.   HENT:      Head: Normocephalic. Hair is normal.      Right Ear: Tympanic membrane and external ear normal.      Left Ear: Tympanic membrane and external ear normal.      Nose: Nose normal. No mucosal edema.      Mouth/Throat:      Pharynx: Uvula midline.   Eyes:      General:         Right eye: No discharge.         Left eye: No discharge.      Conjunctiva/sclera: Conjunctivae normal.      Pupils: Pupils are equal, round, and reactive to light.   Neck:      Musculoskeletal: Normal range of motion and neck supple. No muscular tenderness.      Thyroid: No thyromegaly.      Vascular: No JVD.   Cardiovascular:      Rate and Rhythm: Normal rate and regular rhythm.      Chest Wall: PMI is not displaced.      Pulses:           Carotid pulses are 2+ on the right side and 2+ on the left side.       Femoral pulses are 2+ on the right side and 2+ on the left side.       Dorsalis pedis pulses are 2+ on the right side and 2+ on the left side.      Heart sounds: Normal heart sounds. No " murmur. No friction rub. No gallop.       Comments: Negative Homans' no edema  Pulmonary:      Effort: Pulmonary effort is normal. No respiratory distress.      Breath sounds: Normal breath sounds. No decreased breath sounds, wheezing, rhonchi or rales.   Chest:      Breasts: Breasts are symmetrical.         Right: No inverted nipple, mass, nipple discharge, skin change or tenderness.         Left: No inverted nipple, mass, nipple discharge, skin change or tenderness.   Abdominal:      General: Bowel sounds are normal. There is no distension or abdominal bruit.      Palpations: Abdomen is soft. There is no mass.      Tenderness: There is no abdominal tenderness.      Hernia: There is no hernia in the left inguinal area.   Musculoskeletal: Normal range of motion.         General: No tenderness or deformity.   Lymphadenopathy:      Cervical: No cervical adenopathy.      Upper Body:      Right upper body: No supraclavicular adenopathy.      Left upper body: No supraclavicular adenopathy.   Skin:     General: Skin is warm and dry.      Findings: No ecchymosis, erythema, lesion or rash.   Neurological:      Mental Status: She is alert and oriented to person, place, and time.      Cranial Nerves: No cranial nerve deficit.      Sensory: No sensory deficit.      Motor: No abnormal muscle tone.      Coordination: Coordination normal.      Deep Tendon Reflexes: Reflexes are normal and symmetric. Reflexes normal.   Psychiatric:         Speech: Speech normal.         Behavior: Behavior normal.         Thought Content: Thought content normal. Thought content does not include suicidal ideation.         Cognition and Memory: She does not exhibit impaired recent memory or impaired remote memory.         Judgment: Judgment normal. Judgment is not impulsive.         Assessment and Plan:  Problem List Items Addressed This Visit        High    History of rectal cancer    Overview     Last sigmoidoscopy 11/13/2020 with Dr. Davis  Panda  Repeat x 1 yr  S/p resection, by colonoscopy, CEA mildly elevated will repeat in 3 mos. With repeated rectal surgery follow up if values remain elevated.             Relevant Orders    CEA (Completed)    Hypertension, benign    Overview     Controlled.          Current Assessment & Plan     Hypertension is improving with treatment.  Continue current treatment regimen.  Dietary sodium restriction.  Weight loss.  Regular aerobic exercise.  Blood pressure will be reassessed 1 yr .         Relevant Medications    lisinopril (PRINIVIL,ZESTRIL) 20 MG tablet    Other Relevant Orders    POCT urinalysis dipstick, manual (Completed)    CBC & Differential (Completed)    Comprehensive Metabolic Panel (Completed)    TSH (Completed)       Low    Overweight    Overview     Healthy Diet exercise and wt loss encouraged.          Allergic rhinitis due to pollen    Overview     Stable on prn meds.            Unprioritized    Encounter for screening for malignant neoplasm of breast    Overview     Last mammogram 11/22/2019 at [facility]         Relevant Orders    Mammo Screening Digital Tomosynthesis Bilateral With CAD    Annual visit for general adult medical examination with abnormal findings - Primary    Overview     Good diet  Regular exercise, breast self exams, seat belts, sunscreens encouraged. F/U 1 yr or prn problems         Cervical cancer screening    Overview     Last pap smear 12/13/2019 normal, HPV negative           Asymptomatic menopausal state    Overview     Declined today  Last bone dexa scan 11/09/2005 normal           Screening for hyperlipidemia    Relevant Orders    Lipid Panel With / Chol / HDL Ratio (Completed)      Other Visit Diagnoses     Need for influenza vaccination        Relevant Orders    Fluarix/Fluzone/Afluria Quad>6 Months (Completed)          An After Visit Summary and PPPS were given to the patient.       I wore protective equipment throughout this patient encounter to include mask and eye  protection. Hand hygiene was performed before donning protective equipment and after removal when leaving the room.       Site care done- cleaned with alcohol swab, procedure tolerated well, dressing applied. Venipuncture was obtained after 1 time(s). 2 tubes were drawn. Needle gauge used was 22.

## 2020-12-16 DIAGNOSIS — I10 ESSENTIAL HYPERTENSION: ICD-10-CM

## 2020-12-16 LAB
ALBUMIN SERPL-MCNC: 5.1 G/DL (ref 3.8–4.9)
ALBUMIN/GLOB SERPL: 2 {RATIO} (ref 1.2–2.2)
ALP SERPL-CCNC: 83 IU/L (ref 39–117)
ALT SERPL-CCNC: 15 IU/L (ref 0–32)
AST SERPL-CCNC: 25 IU/L (ref 0–40)
BASOPHILS # BLD AUTO: 0.1 X10E3/UL (ref 0–0.2)
BASOPHILS NFR BLD AUTO: 1 %
BILIRUB SERPL-MCNC: 0.7 MG/DL (ref 0–1.2)
BUN SERPL-MCNC: 13 MG/DL (ref 6–24)
BUN/CREAT SERPL: 14 (ref 9–23)
CALCIUM SERPL-MCNC: 9.8 MG/DL (ref 8.7–10.2)
CEA SERPL-MCNC: 7.2 NG/ML (ref 0–4.7)
CHLORIDE SERPL-SCNC: 94 MMOL/L (ref 96–106)
CHOLEST SERPL-MCNC: 229 MG/DL (ref 100–199)
CHOLEST/HDLC SERPL: 2.2 RATIO (ref 0–4.4)
CO2 SERPL-SCNC: 19 MMOL/L (ref 20–29)
CREAT SERPL-MCNC: 0.93 MG/DL (ref 0.57–1)
EOSINOPHIL # BLD AUTO: 0.1 X10E3/UL (ref 0–0.4)
EOSINOPHIL NFR BLD AUTO: 1 %
ERYTHROCYTE [DISTWIDTH] IN BLOOD BY AUTOMATED COUNT: 12.3 % (ref 11.7–15.4)
GLOBULIN SER CALC-MCNC: 2.5 G/DL (ref 1.5–4.5)
GLUCOSE SERPL-MCNC: 114 MG/DL (ref 65–99)
HCT VFR BLD AUTO: 40.4 % (ref 34–46.6)
HDLC SERPL-MCNC: 102 MG/DL
HGB BLD-MCNC: 14.2 G/DL (ref 11.1–15.9)
IMM GRANULOCYTES # BLD AUTO: 0 X10E3/UL (ref 0–0.1)
IMM GRANULOCYTES NFR BLD AUTO: 0 %
LDLC SERPL CALC-MCNC: 111 MG/DL (ref 0–99)
LYMPHOCYTES # BLD AUTO: 2.1 X10E3/UL (ref 0.7–3.1)
LYMPHOCYTES NFR BLD AUTO: 28 %
MCH RBC QN AUTO: 33.6 PG (ref 26.6–33)
MCHC RBC AUTO-ENTMCNC: 35.1 G/DL (ref 31.5–35.7)
MCV RBC AUTO: 96 FL (ref 79–97)
MONOCYTES # BLD AUTO: 0.7 X10E3/UL (ref 0.1–0.9)
MONOCYTES NFR BLD AUTO: 10 %
NEUTROPHILS # BLD AUTO: 4.5 X10E3/UL (ref 1.4–7)
NEUTROPHILS NFR BLD AUTO: 60 %
PLATELET # BLD AUTO: 369 X10E3/UL (ref 150–450)
POTASSIUM SERPL-SCNC: 4.1 MMOL/L (ref 3.5–5.2)
PROT SERPL-MCNC: 7.6 G/DL (ref 6–8.5)
RBC # BLD AUTO: 4.23 X10E6/UL (ref 3.77–5.28)
SODIUM SERPL-SCNC: 132 MMOL/L (ref 134–144)
TRIGL SERPL-MCNC: 92 MG/DL (ref 0–149)
TSH SERPL DL<=0.005 MIU/L-ACNC: 1.25 UIU/ML (ref 0.45–4.5)
VLDLC SERPL CALC-MCNC: 16 MG/DL (ref 5–40)
WBC # BLD AUTO: 7.5 X10E3/UL (ref 3.4–10.8)

## 2020-12-16 RX ORDER — ATENOLOL 50 MG/1
50 TABLET ORAL 2 TIMES DAILY
Qty: 90 TABLET | Refills: 3 | Status: SHIPPED | OUTPATIENT
Start: 2020-12-16 | End: 2021-06-23 | Stop reason: SDUPTHER

## 2020-12-22 ENCOUNTER — HOSPITAL ENCOUNTER (OUTPATIENT)
Dept: MAMMOGRAPHY | Facility: HOSPITAL | Age: 60
Discharge: HOME | End: 2020-12-22
Admitting: FAMILY MEDICINE

## 2020-12-22 DIAGNOSIS — Z12.31 ENCOUNTER FOR SCREENING MAMMOGRAM FOR MALIGNANT NEOPLASM OF BREAST: ICD-10-CM

## 2020-12-22 PROCEDURE — 77067 SCR MAMMO BI INCL CAD: CPT

## 2020-12-22 PROCEDURE — 77063 BREAST TOMOSYNTHESIS BI: CPT

## 2020-12-28 NOTE — ASSESSMENT & PLAN NOTE
Hypertension is improving with treatment.  Continue current treatment regimen.  Dietary sodium restriction.  Weight loss.  Regular aerobic exercise.  Blood pressure will be reassessed 1 yr .

## 2021-04-28 DIAGNOSIS — C20 RECTAL ADENOCARCINOMA (HCC): Primary | ICD-10-CM

## 2021-05-01 LAB — CEA SERPL-MCNC: 4 NG/ML (ref 0–4.7)

## 2021-06-23 DIAGNOSIS — I10 ESSENTIAL HYPERTENSION: ICD-10-CM

## 2021-06-23 RX ORDER — ATENOLOL 50 MG/1
50 TABLET ORAL 2 TIMES DAILY
Qty: 90 TABLET | Refills: 1 | Status: SHIPPED | OUTPATIENT
Start: 2021-06-23 | End: 2021-10-06 | Stop reason: SDUPTHER

## 2021-10-06 DIAGNOSIS — I10 ESSENTIAL HYPERTENSION: ICD-10-CM

## 2021-10-07 DIAGNOSIS — I10 ESSENTIAL HYPERTENSION: ICD-10-CM

## 2021-10-07 RX ORDER — ATENOLOL 50 MG/1
50 TABLET ORAL 2 TIMES DAILY
Qty: 180 TABLET | Refills: 0 | Status: SHIPPED | OUTPATIENT
Start: 2021-10-07 | End: 2022-01-05 | Stop reason: SDUPTHER

## 2021-10-07 RX ORDER — ATENOLOL 50 MG/1
50 TABLET ORAL 2 TIMES DAILY
Qty: 90 TABLET | Refills: 1 | Status: SHIPPED | OUTPATIENT
Start: 2021-10-07 | End: 2022-01-25 | Stop reason: SDUPTHER

## 2022-01-05 DIAGNOSIS — I10 ESSENTIAL HYPERTENSION: ICD-10-CM

## 2022-01-05 RX ORDER — ATENOLOL 50 MG/1
50 TABLET ORAL 2 TIMES DAILY
Qty: 60 TABLET | Refills: 0 | Status: SHIPPED | OUTPATIENT
Start: 2022-01-05 | End: 2022-01-25

## 2022-01-19 PROBLEM — Z12.11 COLON CANCER SCREENING: Status: ACTIVE | Noted: 2022-01-19

## 2022-01-25 ENCOUNTER — OFFICE VISIT (OUTPATIENT)
Dept: FAMILY MEDICINE CLINIC | Facility: CLINIC | Age: 62
End: 2022-01-25

## 2022-01-25 VITALS
RESPIRATION RATE: 18 BRPM | DIASTOLIC BLOOD PRESSURE: 82 MMHG | HEIGHT: 67 IN | TEMPERATURE: 97.3 F | WEIGHT: 184.8 LBS | SYSTOLIC BLOOD PRESSURE: 132 MMHG | BODY MASS INDEX: 29 KG/M2 | OXYGEN SATURATION: 99 % | HEART RATE: 98 BPM

## 2022-01-25 DIAGNOSIS — Z12.11 COLON CANCER SCREENING: ICD-10-CM

## 2022-01-25 DIAGNOSIS — Z00.01 ANNUAL VISIT FOR GENERAL ADULT MEDICAL EXAMINATION WITH ABNORMAL FINDINGS: Primary | ICD-10-CM

## 2022-01-25 DIAGNOSIS — Z78.0 ASYMPTOMATIC MENOPAUSAL STATE: ICD-10-CM

## 2022-01-25 DIAGNOSIS — Z12.4 CERVICAL CANCER SCREENING: ICD-10-CM

## 2022-01-25 DIAGNOSIS — E66.3 OVERWEIGHT: ICD-10-CM

## 2022-01-25 DIAGNOSIS — Z12.31 ENCOUNTER FOR SCREENING MAMMOGRAM FOR MALIGNANT NEOPLASM OF BREAST: ICD-10-CM

## 2022-01-25 DIAGNOSIS — I10 HYPERTENSION, BENIGN: ICD-10-CM

## 2022-01-25 DIAGNOSIS — Z85.048 HISTORY OF RECTAL CANCER: ICD-10-CM

## 2022-01-25 DIAGNOSIS — Z13.220 SCREENING FOR HYPERLIPIDEMIA: ICD-10-CM

## 2022-01-25 DIAGNOSIS — J30.1 SEASONAL ALLERGIC RHINITIS DUE TO POLLEN: ICD-10-CM

## 2022-01-25 PROBLEM — J06.9 UPPER RESPIRATORY TRACT INFECTION: Status: RESOLVED | Noted: 2020-08-07 | Resolved: 2022-01-25

## 2022-01-25 PROCEDURE — 81002 URINALYSIS NONAUTO W/O SCOPE: CPT | Performed by: FAMILY MEDICINE

## 2022-01-25 PROCEDURE — 36415 COLL VENOUS BLD VENIPUNCTURE: CPT | Performed by: FAMILY MEDICINE

## 2022-01-25 PROCEDURE — 99396 PREV VISIT EST AGE 40-64: CPT | Performed by: FAMILY MEDICINE

## 2022-01-25 PROCEDURE — 99214 OFFICE O/P EST MOD 30 MIN: CPT | Performed by: FAMILY MEDICINE

## 2022-01-25 RX ORDER — HYDROCHLOROTHIAZIDE 25 MG/1
25 TABLET ORAL DAILY
Qty: 90 TABLET | Refills: 3 | Status: SHIPPED | OUTPATIENT
Start: 2022-01-25 | End: 2023-02-02 | Stop reason: SDUPTHER

## 2022-01-25 RX ORDER — ATENOLOL 50 MG/1
50 TABLET ORAL 2 TIMES DAILY
Qty: 180 TABLET | Refills: 1 | Status: SHIPPED | OUTPATIENT
Start: 2022-01-25 | End: 2022-08-09

## 2022-01-26 LAB
ALBUMIN SERPL-MCNC: 4.9 G/DL (ref 3.8–4.9)
ALBUMIN/GLOB SERPL: 1.9 {RATIO} (ref 1.2–2.2)
ALP SERPL-CCNC: 78 IU/L (ref 44–121)
ALT SERPL-CCNC: 21 IU/L (ref 0–32)
AST SERPL-CCNC: 18 IU/L (ref 0–40)
BASOPHILS # BLD AUTO: 0.1 X10E3/UL (ref 0–0.2)
BASOPHILS NFR BLD AUTO: 1 %
BILIRUB SERPL-MCNC: 0.4 MG/DL (ref 0–1.2)
BUN SERPL-MCNC: 17 MG/DL (ref 6–24)
BUN/CREAT SERPL: 19 (ref 9–23)
CALCIUM SERPL-MCNC: 9.9 MG/DL (ref 8.7–10.2)
CHLORIDE SERPL-SCNC: 93 MMOL/L (ref 96–106)
CHOLEST SERPL-MCNC: 209 MG/DL (ref 100–199)
CHOLEST/HDLC SERPL: 2.5 RATIO (ref 0–4.4)
CO2 SERPL-SCNC: 22 MMOL/L (ref 20–29)
CREAT SERPL-MCNC: 0.9 MG/DL (ref 0.57–1)
EOSINOPHIL # BLD AUTO: 0.1 X10E3/UL (ref 0–0.4)
EOSINOPHIL NFR BLD AUTO: 1 %
ERYTHROCYTE [DISTWIDTH] IN BLOOD BY AUTOMATED COUNT: 12 % (ref 11.7–15.4)
GLOBULIN SER CALC-MCNC: 2.6 G/DL (ref 1.5–4.5)
GLUCOSE SERPL-MCNC: 103 MG/DL (ref 65–99)
HCT VFR BLD AUTO: 39.9 % (ref 34–46.6)
HDLC SERPL-MCNC: 82 MG/DL
HGB BLD-MCNC: 14.1 G/DL (ref 11.1–15.9)
IMM GRANULOCYTES # BLD AUTO: 0 X10E3/UL (ref 0–0.1)
IMM GRANULOCYTES NFR BLD AUTO: 0 %
LDLC SERPL CALC-MCNC: 111 MG/DL (ref 0–99)
LYMPHOCYTES # BLD AUTO: 1.4 X10E3/UL (ref 0.7–3.1)
LYMPHOCYTES NFR BLD AUTO: 24 %
MCH RBC QN AUTO: 33.4 PG (ref 26.6–33)
MCHC RBC AUTO-ENTMCNC: 35.3 G/DL (ref 31.5–35.7)
MCV RBC AUTO: 95 FL (ref 79–97)
MONOCYTES # BLD AUTO: 0.7 X10E3/UL (ref 0.1–0.9)
MONOCYTES NFR BLD AUTO: 12 %
NEUTROPHILS # BLD AUTO: 3.5 X10E3/UL (ref 1.4–7)
NEUTROPHILS NFR BLD AUTO: 62 %
PLATELET # BLD AUTO: 369 X10E3/UL (ref 150–450)
POTASSIUM SERPL-SCNC: 4.2 MMOL/L (ref 3.5–5.2)
PROT SERPL-MCNC: 7.5 G/DL (ref 6–8.5)
RBC # BLD AUTO: 4.22 X10E6/UL (ref 3.77–5.28)
SODIUM SERPL-SCNC: 129 MMOL/L (ref 134–144)
TRIGL SERPL-MCNC: 92 MG/DL (ref 0–149)
TSH SERPL-ACNC: 1.6 UIU/ML (ref 0.45–4.5)
VLDLC SERPL CALC-MCNC: 16 MG/DL (ref 5–40)
WBC # BLD AUTO: 5.8 X10E3/UL (ref 3.4–10.8)

## 2022-02-07 ENCOUNTER — HOSPITAL ENCOUNTER (OUTPATIENT)
Dept: MAMMOGRAPHY | Facility: HOSPITAL | Age: 62
Discharge: HOME | End: 2022-02-07
Admitting: FAMILY MEDICINE

## 2022-02-07 DIAGNOSIS — Z12.31 ENCOUNTER FOR SCREENING MAMMOGRAM FOR MALIGNANT NEOPLASM OF BREAST: ICD-10-CM

## 2022-02-07 PROCEDURE — 77067 SCR MAMMO BI INCL CAD: CPT

## 2022-02-07 PROCEDURE — 77063 BREAST TOMOSYNTHESIS BI: CPT

## 2022-06-28 ENCOUNTER — OFFICE VISIT (OUTPATIENT)
Dept: FAMILY MEDICINE CLINIC | Facility: CLINIC | Age: 62
End: 2022-06-28

## 2022-06-28 VITALS
SYSTOLIC BLOOD PRESSURE: 138 MMHG | TEMPERATURE: 97.5 F | OXYGEN SATURATION: 99 % | DIASTOLIC BLOOD PRESSURE: 84 MMHG | HEIGHT: 67 IN | BODY MASS INDEX: 26.3 KG/M2 | WEIGHT: 167.6 LBS | RESPIRATION RATE: 18 BRPM | HEART RATE: 102 BPM

## 2022-06-28 DIAGNOSIS — E66.3 OVERWEIGHT WITH BODY MASS INDEX (BMI) OF 26 TO 26.9 IN ADULT: ICD-10-CM

## 2022-06-28 DIAGNOSIS — J30.1 SEASONAL ALLERGIC RHINITIS DUE TO POLLEN: ICD-10-CM

## 2022-06-28 DIAGNOSIS — H61.21 IMPACTED CERUMEN OF RIGHT EAR: ICD-10-CM

## 2022-06-28 DIAGNOSIS — R50.9 FEVER, UNSPECIFIED FEVER CAUSE: ICD-10-CM

## 2022-06-28 DIAGNOSIS — I10 HYPERTENSION, BENIGN: ICD-10-CM

## 2022-06-28 DIAGNOSIS — J01.00 ACUTE NON-RECURRENT MAXILLARY SINUSITIS: Primary | ICD-10-CM

## 2022-06-28 LAB
EXPIRATION DATE: NORMAL
FLUAV AG UPPER RESP QL IA.RAPID: NOT DETECTED
FLUBV AG UPPER RESP QL IA.RAPID: NOT DETECTED
INTERNAL CONTROL: NORMAL
Lab: NORMAL
SARS-COV-2 AG UPPER RESP QL IA.RAPID: NOT DETECTED

## 2022-06-28 PROCEDURE — 69209 REMOVE IMPACTED EAR WAX UNI: CPT | Performed by: FAMILY MEDICINE

## 2022-06-28 PROCEDURE — 87428 SARSCOV & INF VIR A&B AG IA: CPT | Performed by: FAMILY MEDICINE

## 2022-06-28 PROCEDURE — 99213 OFFICE O/P EST LOW 20 MIN: CPT | Performed by: FAMILY MEDICINE

## 2022-06-28 RX ORDER — MONTELUKAST SODIUM 10 MG/1
10 TABLET ORAL NIGHTLY
Qty: 90 TABLET | Refills: 12 | Status: SHIPPED | OUTPATIENT
Start: 2022-06-28 | End: 2023-01-23

## 2022-06-28 RX ORDER — CETIRIZINE HYDROCHLORIDE 10 MG/1
10 TABLET ORAL DAILY
Qty: 30 TABLET | Refills: 12 | Status: SHIPPED | OUTPATIENT
Start: 2022-06-28

## 2022-06-28 RX ORDER — AMOXICILLIN 500 MG/1
500 TABLET, FILM COATED ORAL 3 TIMES DAILY
Qty: 30 TABLET | Refills: 0 | Status: SHIPPED | OUTPATIENT
Start: 2022-06-28 | End: 2023-01-23

## 2022-06-28 NOTE — PROGRESS NOTES
"Chief Complaint  URI and Hypertension    Subjective     CC  Problem List  Visit Diagnosis   Encounters  Notes  Medications  Labs  Result Review Imaging  Media    Alisha Grimaldo presents to [facility] for   URI   This is a new problem. The current episode started 1 to 4 weeks ago (2 weeks). The problem has been unchanged. The maximum temperature recorded prior to her arrival was 100.4 - 100.9 F. The fever has been present for 3 to 4 days. Associated symptoms include congestion, coughing (mild), ear pain (right), headaches and sinus pain. Pertinent negatives include no abdominal pain, chest pain, nausea, sore throat, vomiting or wheezing. She has tried acetaminophen and decongestant for the symptoms. The treatment provided mild relief.   Hypertension  This is a chronic problem. The current episode started more than 1 year ago. The problem is uncontrolled. Associated symptoms include headaches. Pertinent negatives include no chest pain, orthopnea, palpitations, peripheral edema, PND, shortness of breath or sweats. Risk factors for coronary artery disease include post-menopausal state and obesity. Current antihypertension treatment includes beta blockers and diuretics. The current treatment provides moderate improvement.       Review of Systems   Constitutional: Positive for fatigue. Negative for appetite change, fever and unexpected weight loss.   HENT: Positive for congestion, ear pain (right) and sinus pressure. Negative for sore throat.    Respiratory: Positive for cough (mild). Negative for shortness of breath and wheezing.    Cardiovascular: Negative for chest pain, palpitations, orthopnea and PND.   Gastrointestinal: Negative for abdominal pain, nausea and vomiting.   Hematological: Negative for adenopathy.        Objective   Vital Signs:   /84   Pulse 102   Temp 97.5 °F (36.4 °C) (Temporal)   Resp 18   Ht 170.2 cm (67\")   Wt 76 kg (167 lb 9.6 oz)   SpO2 " 99% Comment: Room air  BMI 26.25 kg/m²     Physical Exam  Constitutional:       General: She is not in acute distress.  HENT:      Right Ear: Tympanic membrane normal. There is impacted cerumen (irrigated until clear).      Left Ear: Tympanic membrane normal.      Nose:      Right Sinus: Maxillary sinus tenderness present.      Left Sinus: Maxillary sinus tenderness present.      Mouth/Throat:      Pharynx: No oropharyngeal exudate or posterior oropharyngeal erythema (moderate post nasal secretions).   Cardiovascular:      Rate and Rhythm: Normal rate and regular rhythm.      Heart sounds: No murmur heard.  Pulmonary:      Effort: Pulmonary effort is normal.      Breath sounds: Normal breath sounds. No wheezing.   Musculoskeletal:      Cervical back: Neck supple.      Right lower leg: No edema.      Left lower leg: No edema.   Lymphadenopathy:      Cervical: No cervical adenopathy.   Skin:     Findings: No rash.   Neurological:      Mental Status: She is alert.        Result Review :Labs  Result Review  Imaging  Med Tab  Media          Ear Cerumen Removal    Date/Time: 6/28/2022 10:30 AM  Performed by: Paola Beyer MD  Authorized by: Paola Beyer MD     Anesthesia:  Local Anesthetic: none  Ceruminolytics applied: Ceruminolytics applied prior to the procedure.  Location details: right ear  Patient tolerance: patient tolerated the procedure well with no immediate complications  Procedure type: irrigation   Sedation:  Patient sedated: no              Assessment and Plan CC Problem List  Visit Diagnosis  ROS  Review (Popup)  Health Maintenance  Quality  BestPractice  Medications  SmartSets  SnapShot Encounters  Media  Problem List Items Addressed This Visit        High    Hypertension, benign    Overview     Marginal with illness, normal values at home           Current Assessment & Plan     Hypertension is improving with treatment.  Continue current treatment regimen.  Dietary sodium  restriction.  Regular aerobic exercise.  Blood pressure will be reassessed at the next regular appointment.              Low    Overweight with body mass index (BMI) of 26 to 26.9 in adult    Overview     Healthy Diet exercise and wt loss encouraged.            Relevant Medications    amoxicillin (AMOXIL) 500 MG tablet    Allergic rhinitis due to pollen    Overview     Exacerbation begin max meds and follow            Relevant Medications    montelukast (SINGULAIR) 10 MG tablet    cetirizine (zyrTEC) 10 MG tablet      Other Visit Diagnoses     Acute non-recurrent maxillary sinusitis    -  Primary    ABx saline flushes notify us of no improvement over 48 hrs    Fever, unspecified fever cause        Neg exam neg flu and covid swabs.     Relevant Orders    POCT SARS-CoV-2 Antigen ALEIDA (Completed)    Impacted cerumen of right ear        Relevant Orders    Cerumen Removal          Follow Up Instructions Charge Capture  Follow-up Communications  No follow-ups on file.  Patient was given instructions and counseling regarding her condition or for health maintenance advice. Please see specific information pulled into the AVS if appropriate.

## 2022-08-09 DIAGNOSIS — I10 HYPERTENSION, BENIGN: ICD-10-CM

## 2022-08-09 RX ORDER — ATENOLOL 50 MG/1
TABLET ORAL
Qty: 180 TABLET | Refills: 1 | Status: SHIPPED | OUTPATIENT
Start: 2022-08-09 | End: 2023-02-16 | Stop reason: SDUPTHER

## 2023-01-23 ENCOUNTER — OFFICE VISIT (OUTPATIENT)
Dept: FAMILY MEDICINE CLINIC | Facility: CLINIC | Age: 63
End: 2023-01-23
Payer: COMMERCIAL

## 2023-01-23 VITALS
SYSTOLIC BLOOD PRESSURE: 138 MMHG | BODY MASS INDEX: 26.53 KG/M2 | OXYGEN SATURATION: 98 % | HEIGHT: 67 IN | HEART RATE: 88 BPM | DIASTOLIC BLOOD PRESSURE: 84 MMHG | RESPIRATION RATE: 18 BRPM | WEIGHT: 169 LBS | TEMPERATURE: 97.1 F

## 2023-01-23 DIAGNOSIS — I10 HYPERTENSION, BENIGN: Primary | ICD-10-CM

## 2023-01-23 DIAGNOSIS — E66.3 OVERWEIGHT WITH BODY MASS INDEX (BMI) OF 26 TO 26.9 IN ADULT: ICD-10-CM

## 2023-01-23 DIAGNOSIS — N30.00 ACUTE CYSTITIS WITHOUT HEMATURIA: ICD-10-CM

## 2023-01-23 DIAGNOSIS — R30.9 PAINFUL URINATION: ICD-10-CM

## 2023-01-23 LAB
BILIRUB BLD-MCNC: NEGATIVE MG/DL
CLARITY, POC: CLEAR
COLOR UR: YELLOW
GLUCOSE UR STRIP-MCNC: NEGATIVE MG/DL
KETONES UR QL: NEGATIVE
LEUKOCYTE EST, POC: NEGATIVE
NITRITE UR-MCNC: NEGATIVE MG/ML
PH UR: 6 [PH] (ref 5–8)
PROT UR STRIP-MCNC: ABNORMAL MG/DL
RBC # UR STRIP: NEGATIVE /UL
SP GR UR: 1.01 (ref 1–1.03)
UROBILINOGEN UR QL: NORMAL

## 2023-01-23 PROCEDURE — 81002 URINALYSIS NONAUTO W/O SCOPE: CPT | Performed by: FAMILY MEDICINE

## 2023-01-23 PROCEDURE — 99213 OFFICE O/P EST LOW 20 MIN: CPT | Performed by: FAMILY MEDICINE

## 2023-01-23 RX ORDER — SULFAMETHOXAZOLE AND TRIMETHOPRIM 800; 160 MG/1; MG/1
1 TABLET ORAL 2 TIMES DAILY
Qty: 20 TABLET | Refills: 0 | Status: SHIPPED | OUTPATIENT
Start: 2023-01-23

## 2023-01-23 NOTE — ASSESSMENT & PLAN NOTE
Patient's (Body mass index is 26.47 kg/m².) indicates that they are overweight with health conditions that include hypertension . Weight is improving with treatment. BMI is is above average; BMI management plan is completed. We discussed low calorie, low carb based diet program, portion control and increasing exercise.

## 2023-01-23 NOTE — PROGRESS NOTES
Chief Complaint  Difficulty Urinating and Hypertension    Subjective     CC  Problem List  Visit Diagnosis   Encounters  Notes  Medications  Labs  Result Review Imaging  Media    Alisha Grimaldo presents to [facility] for   Hypertension  This is a chronic problem. The current episode started more than 1 year ago. The problem is uncontrolled. Pertinent negatives include no anxiety, blurred vision, chest pain, headaches, malaise/fatigue, neck pain, orthopnea, palpitations, peripheral edema, PND, shortness of breath or sweats. Risk factors for coronary artery disease include post-menopausal state and obesity. Current antihypertension treatment includes beta blockers and diuretics. The current treatment provides moderate improvement.   Difficulty Urinating  This is a new problem. The current episode started in the past 7 days. The problem occurs constantly. The problem has been waxing and waning. Pertinent negatives include no abdominal pain, anorexia, arthralgias, change in bowel habit, chest pain, chills, congestion, coughing, diaphoresis, fatigue, fever, headaches, joint swelling, myalgias, nausea, neck pain, numbness, rash, sore throat, swollen glands, urinary symptoms, vertigo, visual change, vomiting or weakness. Treatments tried: Cranberry pills. The treatment provided no relief.       Review of Systems   Constitutional: Negative for chills, diaphoresis, fatigue, fever, malaise/fatigue and unexpected weight loss.   HENT: Negative for congestion, sore throat and swollen glands.    Eyes: Negative for blurred vision.   Respiratory: Negative for cough and shortness of breath.    Cardiovascular: Negative for chest pain, palpitations, orthopnea and PND.   Gastrointestinal: Negative for abdominal pain, anorexia, change in bowel habit, diarrhea, nausea and vomiting.   Endocrine: Negative for cold intolerance, heat intolerance, polydipsia and polyuria.   Genitourinary:  "Positive for frequency and urgency. Negative for dysuria.   Musculoskeletal: Negative for arthralgias, joint swelling, myalgias and neck pain.   Skin: Negative for rash.   Neurological: Negative for vertigo, weakness and numbness.   Hematological: Negative for adenopathy. Does not bruise/bleed easily.        Objective   Vital Signs:   /84   Pulse 88   Temp 97.1 °F (36.2 °C) (Infrared)   Resp 18   Ht 170.2 cm (67\")   Wt 76.7 kg (169 lb)   SpO2 98% Comment: room air  BMI 26.47 kg/m²     Physical Exam  Constitutional:       General: She is not in acute distress.  Cardiovascular:      Rate and Rhythm: Normal rate and regular rhythm.      Heart sounds: No murmur heard.  Pulmonary:      Effort: Pulmonary effort is normal.      Breath sounds: Normal breath sounds. No wheezing.   Abdominal:      General: Abdomen is flat.      Palpations: Abdomen is soft. There is no mass.      Tenderness: There is no abdominal tenderness. There is no right CVA tenderness or left CVA tenderness.   Musculoskeletal:      Cervical back: Neck supple.   Lymphadenopathy:      Cervical: No cervical adenopathy.   Skin:     Findings: No rash.   Neurological:      Mental Status: She is alert.        Result Review :Labs  Result Review  Imaging  Med Tab  Media                 Assessment and Plan CC Problem List  Visit Diagnosis  ROS  Review (Popup)  Health Maintenance  Quality  BestPractice  Medications  SmartSets  SnapShot Encounters  Media  Problem List Items Addressed This Visit        High    Hypertension, benign - Primary    Overview     Marginal control with illness, normal values at home goal 130/80 notify us should her values be higher.          Current Assessment & Plan     Hypertension is improving with treatment.  Continue current treatment regimen.  Dietary sodium restriction.  Weight loss.  Regular aerobic exercise.  Blood pressure will be reassessed at the next regular appointment.            Low    Overweight " with body mass index (BMI) of 26 to 26.9 in adult    Overview     Healthy Diet exercise and wt loss encouraged.          Current Assessment & Plan     Patient's (Body mass index is 26.47 kg/m².) indicates that they are overweight with health conditions that include hypertension . Weight is improving with treatment. BMI is is above average; BMI management plan is completed. We discussed low calorie, low carb based diet program, portion control and increasing exercise.         Other Visit Diagnoses     Acute cystitis without hematuria        Fluids abx, cx notify her of results. She will notify us of no improvement.     Relevant Medications    sulfamethoxazole-trimethoprim (BACTRIM DS,SEPTRA DS) 800-160 MG per tablet    Other Relevant Orders    Urine Culture - Urine, Urine, Clean Catch    Painful urination        Relevant Orders    POCT urinalysis dipstick, manual (Completed)          Follow Up Instructions Charge Capture  Follow-up Communications  Return if symptoms worsen or fail to improve.  Patient was given instructions and counseling regarding her condition or for health maintenance advice. Please see specific information pulled into the AVS if appropriate.

## 2023-01-25 LAB
BACTERIA UR CULT: NO GROWTH
BACTERIA UR CULT: NORMAL

## 2023-02-02 DIAGNOSIS — I10 HYPERTENSION, BENIGN: ICD-10-CM

## 2023-02-03 RX ORDER — HYDROCHLOROTHIAZIDE 25 MG/1
25 TABLET ORAL DAILY
Qty: 90 TABLET | Refills: 3 | Status: SHIPPED | OUTPATIENT
Start: 2023-02-03

## 2023-02-16 DIAGNOSIS — I10 HYPERTENSION, BENIGN: ICD-10-CM

## 2023-02-17 RX ORDER — ATENOLOL 50 MG/1
50 TABLET ORAL 2 TIMES DAILY
Qty: 180 TABLET | Refills: 1 | Status: SHIPPED | OUTPATIENT
Start: 2023-02-17

## 2023-05-02 ENCOUNTER — HOSPITAL ENCOUNTER (OUTPATIENT)
Dept: GENERAL RADIOLOGY | Facility: HOSPITAL | Age: 63
Discharge: HOME | End: 2023-05-02
Admitting: FAMILY MEDICINE
Payer: COMMERCIAL

## 2023-05-02 ENCOUNTER — OFFICE VISIT (OUTPATIENT)
Dept: FAMILY MEDICINE CLINIC | Facility: CLINIC | Age: 63
End: 2023-05-02
Payer: COMMERCIAL

## 2023-05-02 VITALS
OXYGEN SATURATION: 98 % | HEART RATE: 104 BPM | WEIGHT: 170 LBS | RESPIRATION RATE: 18 BRPM | SYSTOLIC BLOOD PRESSURE: 142 MMHG | DIASTOLIC BLOOD PRESSURE: 80 MMHG | BODY MASS INDEX: 26.68 KG/M2 | TEMPERATURE: 97.5 F | HEIGHT: 67 IN

## 2023-05-02 DIAGNOSIS — J01.00 ACUTE NON-RECURRENT MAXILLARY SINUSITIS: Primary | ICD-10-CM

## 2023-05-02 DIAGNOSIS — J30.1 SEASONAL ALLERGIC RHINITIS DUE TO POLLEN: ICD-10-CM

## 2023-05-02 DIAGNOSIS — R05.2 SUBACUTE COUGH: ICD-10-CM

## 2023-05-02 PROCEDURE — 71046 X-RAY EXAM CHEST 2 VIEWS: CPT

## 2023-05-02 PROCEDURE — 99214 OFFICE O/P EST MOD 30 MIN: CPT | Performed by: FAMILY MEDICINE

## 2023-05-02 RX ORDER — CETIRIZINE HYDROCHLORIDE 10 MG/1
10 TABLET ORAL DAILY
Qty: 30 TABLET | Refills: 2 | Status: SHIPPED | OUTPATIENT
Start: 2023-05-02

## 2023-05-02 RX ORDER — MONTELUKAST SODIUM 10 MG/1
10 TABLET ORAL NIGHTLY
Qty: 30 TABLET | Refills: 2 | Status: SHIPPED | OUTPATIENT
Start: 2023-05-02

## 2023-05-02 RX ORDER — CEFPROZIL 500 MG/1
500 TABLET, FILM COATED ORAL 2 TIMES DAILY
Qty: 28 TABLET | Refills: 0 | Status: SHIPPED | OUTPATIENT
Start: 2023-05-02

## 2023-05-02 NOTE — PROGRESS NOTES
Subjective   Alisha Grimaldo is a 62 y.o. female.   Chief Complaint   Patient presents with   • Cough       History of Present Illness  Ongoing dry cough x about 2 months, intermittent. Seems to be better since starting zyrtec.  Did cough and coughed a bit of blood  Having sinus pressure as well   URI   Associated symptoms include congestion and coughing. Pertinent negatives include no chest pain, ear pain, headaches, rash, rhinorrhea, sore throat or wheezing.   Cough  The current episode started more than 1 month ago. The problem has been waxing and waning. The problem occurs every few hours. The cough is non-productive. Associated symptoms include hemoptysis, nasal congestion and postnasal drip. Pertinent negatives include no chest pain, chills, ear congestion, ear pain, fever, headaches, heartburn, myalgias, rash, rhinorrhea, sore throat, shortness of breath, sweats, weight loss or wheezing. The symptoms are aggravated by exercise.        The following portions of the patient's history were reviewed and updated as appropriate: allergies, current medications, past family history, past medical history, past social history, past surgical history and problem list.    Patient Active Problem List   Diagnosis   • Rectal adenocarcinoma (HCC)   • BMI 26.0-26.9,adult   • History of rectal cancer   • Hypertension, benign   • Allergic rhinitis due to pollen   • Encounter for screening for malignant neoplasm of breast   • Annual visit for general adult medical examination with abnormal findings   • Cervical cancer screening   • Asymptomatic menopausal state   • Screening for hyperlipidemia   • Colon cancer screening       Current Outpatient Medications on File Prior to Visit   Medication Sig Dispense Refill   • atenolol (TENORMIN) 50 MG tablet Take 1 tablet by mouth 2 (Two) Times a Day. 180 tablet 1   • Cranberry 500 MG tablet Take 1 tablet by mouth Daily.     • hydroCHLOROthiazide (HYDRODIURIL) 25 MG tablet Take 1 tablet  "by mouth Daily. 90 tablet 3   • ST JOHNS WORT EXTRACT PO Take 1 tablet by mouth Daily.     • [DISCONTINUED] cetirizine (zyrTEC) 10 MG tablet Take 1 tablet by mouth Daily. 30 tablet 12   • [DISCONTINUED] sulfamethoxazole-trimethoprim (BACTRIM DS,SEPTRA DS) 800-160 MG per tablet Take 1 tablet by mouth 2 (Two) Times a Day. (Patient not taking: Reported on 5/2/2023) 20 tablet 0     No current facility-administered medications on file prior to visit.     Current outpatient and discharge medications have been reconciled for the patient.  Reviewed by: Peter Tijerina MD      No Known Allergies    Review of Systems   Constitutional: Negative for chills, fever and unexpected weight loss.   HENT: Positive for congestion and postnasal drip. Negative for ear pain, rhinorrhea and sore throat.    Respiratory: Positive for cough and hemoptysis. Negative for shortness of breath and wheezing.    Cardiovascular: Negative for chest pain.   Musculoskeletal: Negative for myalgias.   Skin: Negative for rash.     I have reviewed and confirmed the accuracy of the ROS as documented by the MA/LPN/RN Peter Tijerina MD    Objective   Visit Vitals  /80 (BP Location: Right arm, Patient Position: Sitting, Cuff Size: Adult)   Pulse 104   Temp 97.5 °F (36.4 °C)   Resp 18   Ht 170.2 cm (67\")   Wt 77.1 kg (170 lb)   LMP 07/12/2016   SpO2 98%   BMI 26.63 kg/m²      **  Physical Exam  Constitutional:       Appearance: She is well-developed.   HENT:      Head: Normocephalic and atraumatic.      Right Ear: External ear normal.      Left Ear: External ear normal.      Nose: Nasal tenderness and congestion present.   Eyes:      Pupils: Pupils are equal, round, and reactive to light.   Cardiovascular:      Rate and Rhythm: Normal rate and regular rhythm.      Heart sounds: Normal heart sounds.   Pulmonary:      Effort: Pulmonary effort is normal.      Breath sounds: Normal breath sounds.   Abdominal:      General: Bowel sounds are " normal.      Palpations: Abdomen is soft.   Musculoskeletal:         General: Normal range of motion.      Cervical back: Normal range of motion and neck supple.   Skin:     General: Skin is warm and dry.   Neurological:      Mental Status: She is alert and oriented to person, place, and time.   Psychiatric:         Behavior: Behavior normal.         Thought Content: Thought content normal.         Judgment: Judgment normal.       Derm Physical Exam    Diagnoses and all orders for this visit:    1. Acute non-recurrent maxillary sinusitis (Primary)  Comments:  likely etio of sx   Orders:  -     cefprozil (CEFZIL) 500 MG tablet; Take 1 tablet by mouth 2 (Two) Times a Day.  Dispense: 28 tablet; Refill: 0    2. Seasonal allergic rhinitis due to pollen  Assessment & Plan:  Continue zyrtec, restart singulair    Orders:  -     cetirizine (zyrTEC) 10 MG tablet; Take 1 tablet by mouth Daily.  Dispense: 30 tablet; Refill: 2  -     montelukast (Singulair) 10 MG tablet; Take 1 tablet by mouth Every Night.  Dispense: 30 tablet; Refill: 2    3. Subacute cough  Comments:  will check cxr given blood, suspect secondary to coughing   Orders:  -     XR Chest PA & Lateral    4. BMI 26.0-26.9,adult  Overview:  Healthy Diet exercise and wt loss encouraged.      Findings discussed. All questions answered.  Differential diagnosis discussed.   Medication and medication adverse effects discussed.  Drug education given and explained to patient. Patient verbalized understanding.  Follow-up in 2 weeks if not better.  Follow-up sooner for worsening symptoms or for any concerns.    BMI is >= 25 and <30. (Overweight) The following options were offered after discussion;: weight loss educational material (shared in after visit summary)    Expected course, medications, and adverse effects discussed as appropriate.  Call or return if worsening or persistent symptoms.     This document is intended for medical professional use only.

## 2023-05-04 ENCOUNTER — TELEPHONE (OUTPATIENT)
Dept: FAMILY MEDICINE CLINIC | Facility: CLINIC | Age: 63
End: 2023-05-04
Payer: COMMERCIAL

## 2023-05-04 DIAGNOSIS — R93.89 ABNORMAL CHEST X-RAY: Primary | ICD-10-CM

## 2023-05-04 NOTE — TELEPHONE ENCOUNTER
Pt notified. CT ordered. Pt will be called after insurance approval. Pt notified to follow up with pcp after CT.

## 2023-05-04 NOTE — TELEPHONE ENCOUNTER
----- Message from Peter Tijerina MD sent at 5/4/2023  8:30 AM EDT -----  Please notify patient that:   She has what is likely a pretty good pneumonia on the right side. Radiologist recommended CT chest with contrast though to get a better look at it.

## 2023-05-24 ENCOUNTER — HOSPITAL ENCOUNTER (OUTPATIENT)
Dept: CT IMAGING | Facility: HOSPITAL | Age: 63
Discharge: HOME | End: 2023-05-24
Admitting: FAMILY MEDICINE
Payer: COMMERCIAL

## 2023-05-24 LAB
CREAT BLDA-MCNC: 0.7 MG/DL (ref 0.6–1.3)
EGFRCR SERPLBLD CKD-EPI 2021: 99.2 ML/MIN/1.73

## 2023-05-24 PROCEDURE — 25510000001 IOPAMIDOL PER 1 ML: Performed by: FAMILY MEDICINE

## 2023-05-24 PROCEDURE — 82565 ASSAY OF CREATININE: CPT

## 2023-05-24 PROCEDURE — 71260 CT THORAX DX C+: CPT

## 2023-05-24 RX ADMIN — IOPAMIDOL 100 ML: 755 INJECTION, SOLUTION INTRAVENOUS at 14:30

## 2023-05-26 DIAGNOSIS — Z85.048 HISTORY OF RECTAL CANCER: ICD-10-CM

## 2023-05-26 DIAGNOSIS — R91.8 LUNG MASS: Primary | ICD-10-CM

## 2023-08-18 DIAGNOSIS — I10 HYPERTENSION, BENIGN: ICD-10-CM

## 2023-08-18 RX ORDER — ATENOLOL 50 MG/1
TABLET ORAL
Qty: 180 TABLET | Refills: 3 | Status: SHIPPED | OUTPATIENT
Start: 2023-08-18

## 2023-10-24 DIAGNOSIS — J30.1 SEASONAL ALLERGIC RHINITIS DUE TO POLLEN: ICD-10-CM

## 2023-11-06 RX ORDER — MONTELUKAST SODIUM 10 MG/1
10 TABLET ORAL NIGHTLY
Qty: 30 TABLET | Refills: 0 | OUTPATIENT
Start: 2023-11-06

## 2023-11-08 DIAGNOSIS — J30.1 SEASONAL ALLERGIC RHINITIS DUE TO POLLEN: ICD-10-CM

## 2023-11-09 RX ORDER — MONTELUKAST SODIUM 10 MG/1
10 TABLET ORAL NIGHTLY
Qty: 90 TABLET | Refills: 3 | Status: SHIPPED | OUTPATIENT
Start: 2023-11-09

## 2024-09-10 ENCOUNTER — READMISSION MANAGEMENT (OUTPATIENT)
Dept: CALL CENTER | Facility: HOSPITAL | Age: 64
End: 2024-09-10
Payer: COMMERCIAL

## 2024-09-10 NOTE — OUTREACH NOTE
Prep Survey      Flowsheet Row Responses   [facility]? Non-[facility]   Is LACE score < 7 ? Non-BH Discharge   Eligibility [facility]   Date of Admission 09/05/24   Date of Discharge 09/09/24   Discharge Disposition Home or Self Care   Discharge diagnosis Mass of lung   Does the patient have one of the following disease processes/diagnoses(primary or secondary)? Other   Prep survey completed? Yes            Marisabel HOOKS - Registered Nurse

## 2024-09-11 ENCOUNTER — TRANSITIONAL CARE MANAGEMENT TELEPHONE ENCOUNTER (OUTPATIENT)
Dept: CALL CENTER | Facility: HOSPITAL | Age: 64
End: 2024-09-11
Payer: COMMERCIAL

## 2024-09-11 NOTE — OUTREACH NOTE
Call Center TCM Note      Flowsheet Row Responses   [facility] patient discharged from? Non-BH   Does the patient have one of the following disease processes/diagnoses(primary or secondary)? Other   TCM attempt successful? No   Unsuccessful attempts Attempt 1            Sadie Gonzales MA    9/11/2024, 15:48 EDT

## 2024-09-11 NOTE — OUTREACH NOTE
Call Center TCM Note      Flowsheet Row Responses   [facility] patient discharged from? Non-BH   Does the patient have one of the following disease processes/diagnoses(primary or secondary)? Other   TCM attempt successful? No   Unsuccessful attempts Attempt 2            Sadie Gonzales MA    9/11/2024, 16:22 EDT

## 2024-09-12 ENCOUNTER — TRANSITIONAL CARE MANAGEMENT TELEPHONE ENCOUNTER (OUTPATIENT)
Dept: CALL CENTER | Facility: HOSPITAL | Age: 64
End: 2024-09-12
Payer: COMMERCIAL

## 2024-09-12 NOTE — OUTREACH NOTE
Call Center TCM Note      Flowsheet Row Responses   [facility] patient discharged from? Non-   Does the patient have one of the following disease processes/diagnoses(primary or secondary)? Other   TCM attempt successful? No   Unsuccessful attempts Attempt 3            Alvarez Torres RN    9/12/2024, 11:56 EDT

## 2024-10-10 DIAGNOSIS — J30.1 SEASONAL ALLERGIC RHINITIS DUE TO POLLEN: ICD-10-CM

## 2024-10-10 DIAGNOSIS — I10 HYPERTENSION, BENIGN: ICD-10-CM

## 2024-10-11 RX ORDER — MONTELUKAST SODIUM 10 MG/1
10 TABLET ORAL EVERY EVENING
Qty: 90 TABLET | Refills: 0 | Status: SHIPPED | OUTPATIENT
Start: 2024-10-11

## 2024-10-11 RX ORDER — ATENOLOL 50 MG/1
TABLET ORAL
Qty: 180 TABLET | Refills: 0 | Status: SHIPPED | OUTPATIENT
Start: 2024-10-11

## 2025-01-10 DIAGNOSIS — I10 HYPERTENSION, BENIGN: ICD-10-CM

## 2025-01-10 DIAGNOSIS — J30.1 SEASONAL ALLERGIC RHINITIS DUE TO POLLEN: ICD-10-CM

## 2025-01-13 RX ORDER — MONTELUKAST SODIUM 10 MG/1
10 TABLET ORAL EVERY EVENING
Qty: 90 TABLET | Refills: 0 | OUTPATIENT
Start: 2025-01-13

## 2025-01-13 RX ORDER — ATENOLOL 50 MG/1
TABLET ORAL
Qty: 180 TABLET | Refills: 0 | OUTPATIENT
Start: 2025-01-13